# Patient Record
Sex: FEMALE | Race: WHITE | NOT HISPANIC OR LATINO | Employment: UNEMPLOYED | URBAN - METROPOLITAN AREA
[De-identification: names, ages, dates, MRNs, and addresses within clinical notes are randomized per-mention and may not be internally consistent; named-entity substitution may affect disease eponyms.]

---
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
## 2022-10-29 ENCOUNTER — HOSPITAL ENCOUNTER (EMERGENCY)
Facility: HOSPITAL | Age: 22
Discharge: HOME | End: 2022-10-29
Attending: EMERGENCY MEDICINE
Payer: MEDICAID

## 2022-10-29 VITALS
BODY MASS INDEX: 30.12 KG/M2 | RESPIRATION RATE: 20 BRPM | TEMPERATURE: 98 F | WEIGHT: 170 LBS | DIASTOLIC BLOOD PRESSURE: 82 MMHG | HEIGHT: 63 IN | HEART RATE: 83 BPM | SYSTOLIC BLOOD PRESSURE: 120 MMHG | OXYGEN SATURATION: 100 %

## 2022-10-29 DIAGNOSIS — J06.9 VIRAL URI WITH COUGH: ICD-10-CM

## 2022-10-29 DIAGNOSIS — J10.1 INFLUENZA A: Primary | ICD-10-CM

## 2022-10-29 LAB
B-HCG UR QL: NEGATIVE
CTP QC/QA: YES
INFLUENZA A ANTIGEN, POC: POSITIVE
INFLUENZA B ANTIGEN, POC: NEGATIVE

## 2022-10-29 PROCEDURE — 99283 EMERGENCY DEPT VISIT LOW MDM: CPT | Mod: ER

## 2022-10-29 PROCEDURE — 87804 INFLUENZA ASSAY W/OPTIC: CPT | Mod: 59,ER

## 2022-10-29 PROCEDURE — 81025 URINE PREGNANCY TEST: CPT | Mod: ER | Performed by: EMERGENCY MEDICINE

## 2022-10-29 RX ORDER — BENZONATATE 200 MG/1
200 CAPSULE ORAL 2 TIMES DAILY PRN
Qty: 20 CAPSULE | Refills: 0 | Status: SHIPPED | OUTPATIENT
Start: 2022-10-29 | End: 2022-11-08

## 2022-10-29 NOTE — ED PROVIDER NOTES
Encounter Date: 10/29/2022       History     Chief Complaint   Patient presents with    Generalized Body Aches     Pt has body aches, headache, cough and congestion that started yesterday      Patient is a 22-year-old female who presents for flu-like symptoms; PMH cigarette use.  Patient reports 3 days of cough, nasal congestion, headache, myalgias.  Family flu positive.  Mild left ear discomfort.  Chest pain with coughing only.  Tylenol earlier today.  The patients available PMH, PSH, Social History, medications, allergies, and triage vital signs were reviewed just prior to their medical evaluation.  A ten point review of systems was completed and is negative except as documented above.  Patient denies any other acute medical complaint.        Review of patient's allergies indicates:  No Known Allergies  No past medical history on file.  No past surgical history on file.  No family history on file.  Social History     Tobacco Use    Smoking status: Every Day     Packs/day: 0.50     Types: Cigarettes    Smokeless tobacco: Never   Substance Use Topics    Alcohol use: Yes     Comment: rare    Drug use: Yes     Types: Marijuana     Comment: medicinal      Review of Systems   Constitutional:  Positive for chills and fatigue. Negative for fever.   HENT:  Positive for congestion and sore throat (mild).    Respiratory:  Positive for cough. Negative for shortness of breath.    Cardiovascular:  Negative for chest pain.   Gastrointestinal:  Negative for abdominal pain, constipation, diarrhea, nausea and vomiting.   Genitourinary:  Negative for dysuria.   Musculoskeletal:  Positive for myalgias. Negative for back pain.   Skin:  Negative for rash.   Neurological:  Positive for headaches. Negative for weakness.   Hematological:  Does not bruise/bleed easily.     Physical Exam     Initial Vitals [10/29/22 1307]   BP Pulse Resp Temp SpO2   106/70 104 18 99.5 °F (37.5 °C) 98 %      MAP       --         Physical Exam    Nursing note  and vitals reviewed.  Constitutional: She appears well-developed and well-nourished. She is not diaphoretic. No distress.   HENT:   Head: Normocephalic and atraumatic.   Right Ear: External ear normal.   Left Ear: External ear normal.   Mouth/Throat: Oropharynx is clear and moist. No oropharyngeal exudate (clear).   Tms clear bilat   Eyes: Conjunctivae and EOM are normal. Pupils are equal, round, and reactive to light. No scleral icterus.   Cardiovascular:  Normal rate, regular rhythm and intact distal pulses.           Pulmonary/Chest: Breath sounds normal. No respiratory distress. She has no wheezes. She has no rhonchi. She has no rales.   Abdominal: Abdomen is soft. Bowel sounds are normal. There is no abdominal tenderness. There is no rebound and no guarding.   Musculoskeletal:         General: No edema. Normal range of motion.     Neurological: She is alert and oriented to person, place, and time. She has normal strength. No cranial nerve deficit or sensory deficit.   Skin: Skin is warm and dry. Capillary refill takes less than 2 seconds. No rash noted. No erythema. No pallor.   Psychiatric: She has a normal mood and affect. Her behavior is normal. Judgment and thought content normal.       ED Course   Procedures  Labs Reviewed   POCT RAPID INFLUENZA A/B - Abnormal; Notable for the following components:       Result Value    Inflenza A Ag positive (*)     All other components within normal limits   POCT URINE PREGNANCY   POCT INFLUENZA A/B MOLECULAR   SARS-COV-2 RDRP GENE          Imaging Results    None          Medications - No data to display  Medical Decision Making:   History:   Old Medical Records: I decided to obtain old medical records.  Old Records Summarized: records from clinic visits and records from previous admission(s).  Initial Assessment:   Flu like symptoms with exposure x 3 days, LTAB. VSS, afebrile  Differential Diagnosis:   Flu, covid, viral syndrome  Physical exam and history taking lower  clinical suspicion for PNA, bacterial pharyngitis, bacterial sinusitis   Clinical Tests:   Lab Tests: Ordered and Reviewed  ED Management:  Discussed pros/cons of tamiflu, patient does not wish to pursue at this time. Conservative measures at home. Patient agreed to plan of care and voiced understanding.  Discharged in stable condition with strict ED return precautions.    Genesis Tracey PA-C             ED Course as of 10/29/22 1428   Sat Oct 29, 2022   1409 Inflenza A Ag(!): positive [MF]      ED Course User Index  [MF] Genesis Tracey PA-C                 Clinical Impression:   Final diagnoses:  [J06.9] Viral URI with cough  [J10.1] Influenza A (Primary)        ED Disposition Condition    Discharge Stable          ED Prescriptions       Medication Sig Dispense Start Date End Date Auth. Provider    benzonatate (TESSALON) 200 MG capsule Take 1 capsule (200 mg total) by mouth 2 (two) times daily as needed for Cough. 20 capsule 10/29/2022 11/8/2022 Genesis Tracey PA-C          Follow-up Information    None          Genesis Tracey PA-C  10/29/22 1422

## 2022-10-29 NOTE — Clinical Note
"Valeria Prescott "Valeria Cortés was seen and treated in our emergency department on 10/29/2022.  She may return to work on 11/02/2022.  Flu positive     If you have any questions or concerns, please don't hesitate to call.      Genesis Tracey PA-C"

## 2022-10-29 NOTE — DISCHARGE INSTRUCTIONS
-tylenol, advil for symptoms. Plenty of fluids. Cough medicine as prescribed  Return for trouble breathing

## 2022-10-29 NOTE — ED NOTES
NAD AT THIS TIME. AAOX4. RX AND D/C INFOMATION GIVEN TO REVIEWED WITH PT. PT DENIES ANY FURTHER NEEDS OR QUESTIONS. Respirations are regular, unlabored and clear at this time. AMB OUT TO POV WITH STEADY GAIT.